# Patient Record
Sex: MALE
[De-identification: names, ages, dates, MRNs, and addresses within clinical notes are randomized per-mention and may not be internally consistent; named-entity substitution may affect disease eponyms.]

---

## 2022-11-28 ENCOUNTER — NURSE TRIAGE (OUTPATIENT)
Dept: OTHER | Facility: CLINIC | Age: 27
End: 2022-11-28

## 2022-11-29 NOTE — TELEPHONE ENCOUNTER
Location of patient: [address] call from [facility] at [facility] Name: Domingo Savage MRN: [id number]    Subjective: Caller states     he saw Marie Alvarado in the office earlier and had a prescription for cipro 500mg tablets called in to the pharmacy. Caller states pharmacy never received prescription. On call  notified and called prescription into [facility] on [address]. [phone]. Caller notified. No triage needed at this time. Reason for Disposition   Medication renewal and refill questions   [1] Prescription not at pharmacy AND [2] was prescribed by PCP recently (Exception: triager has access to EMR and prescription is recorded there.  Go to Home [facility] and confirm for pharmacy.)    Protocols used: PCP Call - No Triage-ADULT-, Medication Question Call-ADULT-